# Patient Record
Sex: MALE | Race: WHITE | Employment: FULL TIME | URBAN - METROPOLITAN AREA
[De-identification: names, ages, dates, MRNs, and addresses within clinical notes are randomized per-mention and may not be internally consistent; named-entity substitution may affect disease eponyms.]

---

## 2018-06-14 ENCOUNTER — OFFICE VISIT (OUTPATIENT)
Dept: ORTHOPEDIC SURGERY | Age: 53
End: 2018-06-14

## 2018-06-14 VITALS — HEIGHT: 70 IN | BODY MASS INDEX: 28.63 KG/M2 | WEIGHT: 200 LBS

## 2018-06-14 DIAGNOSIS — M79.602 LEFT ARM PAIN: ICD-10-CM

## 2018-06-14 DIAGNOSIS — M25.522 LEFT ELBOW PAIN: Primary | ICD-10-CM

## 2018-06-14 PROCEDURE — 99203 OFFICE O/P NEW LOW 30 MIN: CPT | Performed by: ORTHOPAEDIC SURGERY

## 2018-06-14 RX ORDER — METHYLPREDNISOLONE 4 MG/1
TABLET ORAL
Qty: 1 KIT | Refills: 0 | Status: SHIPPED | OUTPATIENT
Start: 2018-06-14 | End: 2018-07-05 | Stop reason: SDUPTHER

## 2018-07-06 RX ORDER — METHYLPREDNISOLONE 4 MG/1
TABLET ORAL
Qty: 1 KIT | Refills: 0 | Status: SHIPPED | OUTPATIENT
Start: 2018-07-06

## 2018-07-06 NOTE — TELEPHONE ENCOUNTER
PATIENT IS UNABLE TO TAKE NSAIDS. IS IN [place], HAVING ELBOW PAIN. FOLLOWS UP WITH [facility] IN July.  ASKING FOR A REFILL ON MEDROL DOSE PACK

## 2018-07-19 ENCOUNTER — OFFICE VISIT (OUTPATIENT)
Dept: ORTHOPEDIC SURGERY | Age: 53
End: 2018-07-19

## 2018-07-19 DIAGNOSIS — M79.602 PAIN OF LEFT UPPER EXTREMITY: Primary | ICD-10-CM

## 2018-07-19 DIAGNOSIS — M79.602 LEFT ARM PAIN: Primary | ICD-10-CM

## 2018-07-19 PROCEDURE — 95886 MUSC TEST DONE W/N TEST COMP: CPT | Performed by: PHYSICAL MEDICINE & REHABILITATION

## 2018-07-19 PROCEDURE — 95908 NRV CNDJ TST 3-4 STUDIES: CPT | Performed by: PHYSICAL MEDICINE & REHABILITATION

## 2018-07-19 PROCEDURE — 99213 OFFICE O/P EST LOW 20 MIN: CPT | Performed by: ORTHOPAEDIC SURGERY

## 2018-07-19 RX ORDER — ATORVASTATIN CALCIUM 80 MG/1
80 TABLET, FILM COATED ORAL
COMMUNITY

## 2018-07-19 RX ORDER — OMEPRAZOLE 40 MG/1
CAPSULE, DELAYED RELEASE ORAL
COMMUNITY
Start: 2018-07-13

## 2018-07-19 RX ORDER — DIVALPROEX SODIUM 500 MG/1
1000 TABLET, DELAYED RELEASE ORAL
COMMUNITY
Start: 2018-04-09 | End: 2018-10-09

## 2018-07-19 RX ORDER — OMEPRAZOLE 40 MG/1
CAPSULE, DELAYED RELEASE ORAL
COMMUNITY
Start: 2018-07-13 | End: 2018-07-19

## 2018-07-19 NOTE — PROGRESS NOTES
Chief Complaint:  Results (TR EMG)      History of Present of Illness: The patient returns for his left arm pain of unclear etiology. After I last saw him we had ordered an EMG test but he had a severe episode of increasing pain along the left upper arm when he was on a business trip in [place]. We went ahead and called him in a prednisone pack which helped him dramatically. He did go to a chiropractor who did some manipulations on his neck but it made no difference to his arm pain. He continues to feel some residual soreness in his left shoulder where he did have prior surgical intervention with Dr. Sherry Donovan. He denies any numbness or tingling into the fingers. He denies any weakness today into the hand. Together we went through the EMG testing. Thankfully, this demonstrated a fairly clean EMG test without any signs of distal compressive neuropathy. Importantly, there is no widespread polyneuropathy or obvious cervical radiculopathy as well. Past Medical History:   Diagnosis Date    Depression          Examination:  On exam today he demonstrates his baseline near full range of motion of the left shoulder with some discomfort and mild crepitance with abduction and forward elevation. He does have the retracted biceps asymmetry but there is no real tenderness over the anterior arm at the biceps. Most of his pain is deeply and somewhat diffusely along the lateral upper arm and along the region just proximal to the common extensor origin. With tapping I'm not able to elicit a Tinel's over the radial nerve, and there is no pain when I press firmly into the radial tunnel. On today's examination he demonstrates no weakness and middle finger extension nor is there any weakness in . Fingers are perfused and sensate today.     Radiology:     X-rays obtained and reviewed in office:  Views    Location    Impression      No orders of the defined types were placed in this encounter. Impression:  Encounter Diagnosis   Name Primary?  Left arm pain Yes         Treatment Plan:  I leveled with the patient that I am unclear exactly what is causing his left upper arm pain. I am not convinced now that there is a distal compressive neuropathy that I would be able to help him with. I presented an initial recommendation to sit back down with Dr. Alondra Sandy to make sure we are not witnessing some type of irritation at the shoulder that could be causing referred pain down the lateral arm. If that turns out to be overall negative, having a neurologist evaluate him may indeed be helpful, as he certainly did respond to a strong dose of anti-inflammatory prednisone. Please note that this transcription was created using voice recognition software. Any errors are unintentional and may be due to voice recognition transcription.

## 2018-07-19 NOTE — PROGRESS NOTES
[address] MEDICINE      Patient: Lee Gaspar Age: 52 Years 0 Months  Sex: Male Date: 7/19/2018  YOB: 1965 Ref. Phys.: Dr Michel Hall  Notes:  left elbow and shoulder pain      Sensory NCS      Nerve / Sites Peak PeakAmp Dist Christiano    ms µV cm m/s   L MEDIAN - D2 ULNAR D5   1. Median Wrist 3.10 20.5 14 58.3   2. Ulnar Wrist 3.15 18.0 14 53.8   L MEDIAN - RADIAL THUMB   1. Median Wrist       2. Radial Wrist 2.15 21.0 10 58.8       Motor NCS      Nerve / Sites Lat Amp Amp Dist Christiano    ms mV % cm m/s   L MEDIAN - APB   1. Wrist 3.20 13.1 100 8    2. Elbow 7.55 12.2 92.7 25 57.5   L ULNAR - ADM   1. Wrist 3.25 7.0 100 8    2. B. Elbow 6.45 6.6 95.4 21 65.6   3. A. Elbow 8.40 6.9 98.6 10 51.3       EMG Summary Table     Spontaneous MUAP Recruit. Ins. Act Fibs. PSW Fasics. H.F. Amp. Dur. Poly's. Pattern   L. FIRST D INTEROSS N None None None None N N N N   L. BICEPS N None None None None N N N N   L. TRICEPS N None None None None N N N N   L. EXT DIG COMM N None None None None N N N N   L. PRON TERES N None None None None N N N N   L. CERV PSP (L) N None None None None N N N N   L. DELTOID N None None None None N N N N       Summary: Nerve conduction studies and monopolar exam of the left upper extremity are normal.      Impression: Normal examination.     1.  No evidence of a left upper extremity mononeuropathy, plexopathy, or radiculopathy            Carissa Valencia MD